# Patient Record
Sex: MALE
[De-identification: names, ages, dates, MRNs, and addresses within clinical notes are randomized per-mention and may not be internally consistent; named-entity substitution may affect disease eponyms.]

---

## 2021-05-27 ENCOUNTER — APPOINTMENT (OUTPATIENT)
Dept: DERMATOLOGY | Facility: CLINIC | Age: 71
End: 2021-05-27
Payer: MEDICARE

## 2021-05-27 PROCEDURE — 99072 ADDL SUPL MATRL&STAF TM PHE: CPT

## 2021-05-27 PROCEDURE — 99204 OFFICE O/P NEW MOD 45 MIN: CPT

## 2021-06-10 ENCOUNTER — TRANSCRIPTION ENCOUNTER (OUTPATIENT)
Age: 71
End: 2021-06-10

## 2021-09-21 ENCOUNTER — APPOINTMENT (OUTPATIENT)
Dept: DERMATOLOGY | Facility: CLINIC | Age: 71
End: 2021-09-21
Payer: MEDICARE

## 2021-09-21 PROCEDURE — 99214 OFFICE O/P EST MOD 30 MIN: CPT

## 2022-06-11 ENCOUNTER — NON-APPOINTMENT (OUTPATIENT)
Age: 72
End: 2022-06-11

## 2024-05-26 ENCOUNTER — NON-APPOINTMENT (OUTPATIENT)
Age: 74
End: 2024-05-26

## 2024-05-27 VITALS — HEART RATE: 66 BPM | RESPIRATION RATE: 14 BRPM | OXYGEN SATURATION: 96 % | TEMPERATURE: 99.4 F

## 2024-05-27 DIAGNOSIS — R01.1 CARDIAC MURMUR, UNSPECIFIED: ICD-10-CM

## 2024-05-27 DIAGNOSIS — J20.9 ACUTE BRONCHITIS, UNSPECIFIED: ICD-10-CM

## 2024-05-27 DIAGNOSIS — J44.9 CHRONIC OBSTRUCTIVE PULMONARY DISEASE, UNSPECIFIED: ICD-10-CM

## 2024-05-27 DIAGNOSIS — R50.9 FEVER, UNSPECIFIED: ICD-10-CM

## 2024-05-27 NOTE — PHYSICAL EXAM
[General Appearance - Alert] : alert [General Appearance - In No Acute Distress] : in no acute distress [Sclera] : the sclera and conjunctiva were normal [Outer Ear] : the ears and nose were normal in appearance [Oropharynx] : the oropharynx was normal with no thrush [Neck Appearance] : the appearance of the neck was normal [Neck Cervical Mass (___cm)] : no neck mass was observed [Jugular Venous Distention Increased] : there was no jugular-venous distention [Thyroid Diffuse Enlargement] : the thyroid was not enlarged [Auscultation Breath Sounds / Voice Sounds] : lungs were clear to auscultation bilaterally [Heart Rate And Rhythm] : heart rate was normal and rhythm regular [Full Pulse] : the pedal pulses are present [Edema] : there was no peripheral edema [Bowel Sounds] : normal bowel sounds [Abdomen Soft] : soft [Abdomen Tenderness] : non-tender [Costovertebral Angle Tenderness] : no CVA tenderness [Abdomen Mass (___ Cm)] : no abdominal mass palpated [No Palpable Adenopathy] : no palpable adenopathy [Skin Color & Pigmentation] : normal skin color and pigmentation [] : no rash [FreeTextEntry1] : No manifestations of endocarditis

## 2024-05-27 NOTE — HISTORY OF PRESENT ILLNESS
[FreeTextEntry1] : patient is a 74-year-old male who I am asked to evaluate due to ongoing fever and bronchitis.  This was a combined telehealth and home visit evaluation Patient returned from vacation with what sounds like viral bronchitis was seen by his primary care physician approximately 7 days ago was placed on cefuroxime 500 mg twice a day his Tmax [id number] Since that time patient has had continued problems of coughing no significant shortness of breath and had a normal chest x-ray and has been able to do most of his normal activities.  Past medical history is significant for cardiac murmur that he still does have for a long time and has not changed according to the patient. Today the wife noted the patient looked gray and I was called to see him urgently at home.  By the time I got to his house he was just complaining of cough.  He had no complaints of chest pain his Tmax again was only 100 he had no chills or shortness of breath.

## 2024-05-27 NOTE — REVIEW OF SYSTEMS
[Fever] : fever [Chills] : no chills [Body Aches] : no body aches [Difficulty Sleeping] : no difficulty sleeping [Feeling Tired] : feeling tired [As noted in HPI] : as noted in HPI [As Noted in HPI] : as noted in HPI [Shortness Of Breath] : no shortness of breath [Wheezing] : wheezing [Cough] : cough [SOB on Exertion] : shortness of breath during exertion [Sputum] : not coughing up ~M sputum [Pleuritic Chest Pain] : no pleuritic chest pain [Negative] : Integumentary [FreeTextEntry5] : h/o m

## 2024-05-27 NOTE — ASSESSMENT
[FreeTextEntry1] :  Asked to evaluate patient because of family concern for his color.  By time he was evaluated his color was normal his pulse ox was normal at rest and with climbing up and down steps twice her heart rate went from 66-72 but pulse oximetry stayed at 96%.  Only finding on exam which of concern is a loud cardiac murmur of unclear duration. While this may all be pulmonary in nature I have concerned about his low-grade fever that has persisted on antibiotics.  Unfortunately blood cultures will probably be negative due to use of cefuroxime as of this morning.  For now I have told the patient to stop all antibiotics blood work and cultures and nasopharyngeal swabs were ordered for the morning.  Chest x-ray was reviewed and was unremarkable.  I have asked him to follow-up with his primary medical doctor who is her cardiologist within 24 hours.  Patient will self monitor temperature 4 times a day and continue to check his pulse oximetry both supine and with ambulation. He will be seen if needed [Treatment Education] : treatment education [Treatment Adherence] : treatment adherence

## 2024-05-28 ENCOUNTER — NON-APPOINTMENT (OUTPATIENT)
Age: 74
End: 2024-05-28

## 2024-05-29 ENCOUNTER — NON-APPOINTMENT (OUTPATIENT)
Age: 74
End: 2024-05-29

## 2024-05-29 LAB
ALBUMIN SERPL ELPH-MCNC: 3.4 G/DL
ALP BLD-CCNC: 100 U/L
ALT SERPL-CCNC: 66 U/L
ANION GAP SERPL CALC-SCNC: 14 MMOL/L
AST SERPL-CCNC: 38 U/L
BASOPHILS # BLD AUTO: 0.03 K/UL
BASOPHILS NFR BLD AUTO: 0.4 %
BILIRUB SERPL-MCNC: 0.7 MG/DL
BUN SERPL-MCNC: 38 MG/DL
CALCIUM SERPL-MCNC: 9.2 MG/DL
CHLORIDE SERPL-SCNC: 103 MMOL/L
CO2 SERPL-SCNC: 20 MMOL/L
CREAT SERPL-MCNC: 1.4 MG/DL
EGFR: 52 ML/MIN/1.73M2
EOSINOPHIL # BLD AUTO: 0.35 K/UL
EOSINOPHIL NFR BLD AUTO: 4.4 %
GLUCOSE SERPL-MCNC: 147 MG/DL
HCT VFR BLD CALC: 33.8 %
HGB BLD-MCNC: 11 G/DL
IMM GRANULOCYTES NFR BLD AUTO: 0.6 %
LYMPHOCYTES # BLD AUTO: 1.43 K/UL
LYMPHOCYTES NFR BLD AUTO: 17.9 %
MAN DIFF?: NORMAL
MCHC RBC-ENTMCNC: 32.2 PG
MCHC RBC-ENTMCNC: 32.5 GM/DL
MCV RBC AUTO: 98.8 FL
MONOCYTES # BLD AUTO: 0.67 K/UL
MONOCYTES NFR BLD AUTO: 8.4 %
NEUTROPHILS # BLD AUTO: 5.45 K/UL
NEUTROPHILS NFR BLD AUTO: 68.3 %
PLATELET # BLD AUTO: 247 K/UL
POTASSIUM SERPL-SCNC: 5.2 MMOL/L
PROT SERPL-MCNC: 6.3 G/DL
RBC # BLD: 3.42 M/UL
RBC # FLD: 13.4 %
SODIUM SERPL-SCNC: 137 MMOL/L
WBC # FLD AUTO: 7.98 K/UL

## 2024-06-03 LAB
BACTERIA BLD CULT: NORMAL
BACTERIA BLD CULT: NORMAL

## 2025-02-26 ENCOUNTER — OFFICE (OUTPATIENT)
Dept: URBAN - METROPOLITAN AREA CLINIC 12 | Facility: CLINIC | Age: 75
Setting detail: OPHTHALMOLOGY
End: 2025-02-26
Payer: MEDICARE

## 2025-02-26 DIAGNOSIS — H35.371: ICD-10-CM

## 2025-02-26 DIAGNOSIS — H25.13: ICD-10-CM

## 2025-02-26 DIAGNOSIS — H33.311: ICD-10-CM

## 2025-02-26 PROCEDURE — 99214 OFFICE O/P EST MOD 30 MIN: CPT | Performed by: OPHTHALMOLOGY

## 2025-02-26 PROCEDURE — 92250 FUNDUS PHOTOGRAPHY W/I&R: CPT | Performed by: OPHTHALMOLOGY

## 2025-02-26 ASSESSMENT — REFRACTION_MANIFEST
OD_AXIS: 105
OS_CYLINDER: -2.75
OS_SPHERE: +1.00
OD_SPHERE: +0.75
OS_AXIS: 90
OD_VA1: 20/50-1
OS_VA1: 20/30-2
OD_CYLINDER: -2.25

## 2025-02-26 ASSESSMENT — VISUAL ACUITY
OS_BCVA: 20/60+2
OD_BCVA: 20/40+1

## 2025-02-26 ASSESSMENT — CONFRONTATIONAL VISUAL FIELD TEST (CVF)
OS_FINDINGS: FULL
OD_FINDINGS: FULL

## 2025-02-26 ASSESSMENT — REFRACTION_AUTOREFRACTION
OD_CYLINDER: -2.25
OD_SPHERE: +0.75
OS_SPHERE: +1.00
OD_AXIS: 105
OS_CYLINDER: -2.75
OS_AXIS: 90

## 2025-02-26 ASSESSMENT — KERATOMETRY
OD_AXISANGLE_DEGREES: 19
OD_K2POWER_DIOPTERS: 39.75
OS_AXISANGLE_DEGREES: 150
OS_K2POWER_DIOPTERS: 41.50
OS_K1POWER_DIOPTERS: 40.50
OD_K1POWER_DIOPTERS: 38.75

## 2025-02-26 ASSESSMENT — TONOMETRY
OS_IOP_MMHG: 11
OD_IOP_MMHG: 11

## 2025-03-13 ENCOUNTER — OFFICE (OUTPATIENT)
Dept: URBAN - METROPOLITAN AREA CLINIC 12 | Facility: CLINIC | Age: 75
Setting detail: OPHTHALMOLOGY
End: 2025-03-13
Payer: MEDICARE

## 2025-03-13 DIAGNOSIS — H25.13: ICD-10-CM

## 2025-03-13 DIAGNOSIS — H25.11: ICD-10-CM

## 2025-03-13 PROCEDURE — 92136 OPHTHALMIC BIOMETRY: CPT | Mod: TC | Performed by: OPHTHALMOLOGY

## 2025-03-13 PROCEDURE — 92136 OPHTHALMIC BIOMETRY: CPT | Mod: 26,RT | Performed by: OPHTHALMOLOGY

## 2025-03-13 PROCEDURE — 99213 OFFICE O/P EST LOW 20 MIN: CPT | Performed by: OPHTHALMOLOGY

## 2025-03-13 ASSESSMENT — REFRACTION_MANIFEST
OS_CYLINDER: -2.75
OD_VA1: 20/50-1
OS_VA1: 20/30-2
OD_SPHERE: +0.75
OS_AXIS: 90
OD_AXIS: 105
OD_CYLINDER: -2.25
OS_SPHERE: +1.00

## 2025-03-13 ASSESSMENT — REFRACTION_AUTOREFRACTION
OS_SPHERE: -0.50
OS_CYLINDER: -1.25
OD_CYLINDER: -1.50
OS_AXIS: 101
OD_SPHERE: +0.25
OD_AXIS: 105

## 2025-03-13 ASSESSMENT — TONOMETRY: OS_IOP_MMHG: 11

## 2025-03-13 ASSESSMENT — CONFRONTATIONAL VISUAL FIELD TEST (CVF)
OD_FINDINGS: FULL
OS_FINDINGS: FULL

## 2025-03-13 ASSESSMENT — KERATOMETRY
OD_K2POWER_DIOPTERS: 39.50
OD_K1POWER_DIOPTERS: 39.00
OD_AXISANGLE_DEGREES: 043
OS_K1POWER_DIOPTERS: 40.75
OS_AXISANGLE_DEGREES: 103
OS_K2POWER_DIOPTERS: 41.25

## 2025-03-13 ASSESSMENT — VISUAL ACUITY
OS_BCVA: 20/80
OD_BCVA: 20/50-2

## 2025-03-25 ENCOUNTER — ASC (OUTPATIENT)
Dept: URBAN - METROPOLITAN AREA SURGERY 8 | Facility: SURGERY | Age: 75
Setting detail: OPHTHALMOLOGY
End: 2025-03-25
Payer: MEDICARE

## 2025-03-25 DIAGNOSIS — H25.11: ICD-10-CM

## 2025-03-25 DIAGNOSIS — H52.221: ICD-10-CM

## 2025-03-25 PROCEDURE — 66984 XCAPSL CTRC RMVL W/O ECP: CPT | Mod: 54,RT | Performed by: OPHTHALMOLOGY

## 2025-03-25 PROCEDURE — FEMTO PRECISION LASER CATARACT SURGERY: Mod: GY | Performed by: OPHTHALMOLOGY

## 2025-03-25 PROCEDURE — 68841 INSJ RX ELUT IMPLT LAC CANAL: CPT | Mod: RT | Performed by: OPHTHALMOLOGY

## 2025-03-25 PROCEDURE — S9986 NOT MEDICALLY NECESSARY SVC: HCPCS | Mod: GX,GY | Performed by: OPHTHALMOLOGY

## 2025-03-26 ENCOUNTER — OFFICE (OUTPATIENT)
Dept: URBAN - METROPOLITAN AREA CLINIC 12 | Facility: CLINIC | Age: 75
Setting detail: OPHTHALMOLOGY
End: 2025-03-26
Payer: MEDICARE

## 2025-03-26 ENCOUNTER — RX ONLY (RX ONLY)
Age: 75
End: 2025-03-26

## 2025-03-26 DIAGNOSIS — H25.12: ICD-10-CM

## 2025-03-26 PROBLEM — Z96.1 PSEUDOPHAKIA-1 DAY P/O CAT W/ IOL: Status: ACTIVE | Noted: 2025-03-26

## 2025-03-26 PROCEDURE — 92136 OPHTHALMIC BIOMETRY: CPT | Mod: 26,LT | Performed by: OPHTHALMOLOGY

## 2025-03-26 ASSESSMENT — REFRACTION_AUTOREFRACTION
OS_AXIS: 094
OD_AXIS: 115
OD_CYLINDER: -1.00
OS_SPHERE: -0.25
OS_CYLINDER: -2.00
OD_SPHERE: PLANO

## 2025-03-26 ASSESSMENT — REFRACTION_MANIFEST
OD_SPHERE: +0.75
OS_SPHERE: +1.00
OD_AXIS: 105
OD_VA1: 20/50-1
OS_AXIS: 90
OD_CYLINDER: -2.25
OS_CYLINDER: -2.75
OS_VA1: 20/30-2

## 2025-03-26 ASSESSMENT — KERATOMETRY
OS_K1POWER_DIOPTERS: 40.75
OS_K2POWER_DIOPTERS: 41.25
OD_K2POWER_DIOPTERS: 39.00
OD_AXISANGLE_DEGREES: 029
OS_AXISANGLE_DEGREES: 160
OD_K1POWER_DIOPTERS: 38.50

## 2025-03-26 ASSESSMENT — CONFRONTATIONAL VISUAL FIELD TEST (CVF)
OS_FINDINGS: FULL
OD_FINDINGS: FULL

## 2025-03-26 ASSESSMENT — TONOMETRY: OD_IOP_MMHG: 15

## 2025-03-26 ASSESSMENT — VISUAL ACUITY
OS_BCVA: 20/60+2
OD_BCVA: 20/60+2

## 2025-04-08 ENCOUNTER — ASC (OUTPATIENT)
Dept: URBAN - METROPOLITAN AREA SURGERY 8 | Facility: SURGERY | Age: 75
Setting detail: OPHTHALMOLOGY
End: 2025-04-08
Payer: MEDICARE

## 2025-04-08 DIAGNOSIS — H25.12: ICD-10-CM

## 2025-04-08 DIAGNOSIS — H52.222: ICD-10-CM

## 2025-04-08 PROCEDURE — S9986 NOT MEDICALLY NECESSARY SVC: HCPCS | Mod: GX,GY | Performed by: OPHTHALMOLOGY

## 2025-04-08 PROCEDURE — FEMTO PRECISION LASER CATARACT SURGERY: Mod: GY | Performed by: OPHTHALMOLOGY

## 2025-04-08 PROCEDURE — 68841 INSJ RX ELUT IMPLT LAC CANAL: CPT | Mod: 79,LT | Performed by: OPHTHALMOLOGY

## 2025-04-08 PROCEDURE — 66984 XCAPSL CTRC RMVL W/O ECP: CPT | Mod: 54,79,LT | Performed by: OPHTHALMOLOGY

## 2025-04-09 ENCOUNTER — OFFICE (OUTPATIENT)
Dept: URBAN - METROPOLITAN AREA CLINIC 12 | Facility: CLINIC | Age: 75
Setting detail: OPHTHALMOLOGY
End: 2025-04-09
Payer: MEDICARE

## 2025-04-09 DIAGNOSIS — Z96.1: ICD-10-CM

## 2025-04-09 PROCEDURE — 99024 POSTOP FOLLOW-UP VISIT: CPT | Performed by: OPTOMETRIST

## 2025-04-09 ASSESSMENT — CONFRONTATIONAL VISUAL FIELD TEST (CVF)
OS_FINDINGS: FULL
OD_FINDINGS: FULL

## 2025-04-09 ASSESSMENT — REFRACTION_AUTOREFRACTION
OD_CYLINDER: -1.25
OS_CYLINDER: -2.25
OS_SPHERE: -0.25
OD_SPHERE: -0.25
OD_AXIS: 099
OS_AXIS: 171

## 2025-04-09 ASSESSMENT — KERATOMETRY
OD_AXISANGLE_DEGREES: 015
OD_K2POWER_DIOPTERS: 39.50
OS_K1POWER_DIOPTERS: 40.25
OS_K2POWER_DIOPTERS: 43.25
OS_AXISANGLE_DEGREES: 085
OD_K1POWER_DIOPTERS: 38.75

## 2025-04-09 ASSESSMENT — REFRACTION_MANIFEST
OS_SPHERE: +1.00
OD_VA1: 20/50-1
OS_CYLINDER: -2.75
OD_AXIS: 105
OD_CYLINDER: -2.25
OD_SPHERE: +0.75
OS_AXIS: 90
OS_VA1: 20/30-2

## 2025-04-09 ASSESSMENT — TONOMETRY
OD_IOP_MMHG: 13
OS_IOP_MMHG: 15

## 2025-04-09 ASSESSMENT — VISUAL ACUITY
OD_BCVA: 20/40+2
OS_BCVA: 20/60

## 2025-07-29 ENCOUNTER — NON-APPOINTMENT (OUTPATIENT)
Age: 75
End: 2025-07-29

## 2025-07-31 ENCOUNTER — APPOINTMENT (OUTPATIENT)
Dept: NEUROSURGERY | Facility: CLINIC | Age: 75
End: 2025-07-31
Payer: MEDICARE

## 2025-07-31 VITALS
HEIGHT: 71 IN | SYSTOLIC BLOOD PRESSURE: 159 MMHG | OXYGEN SATURATION: 98 % | HEART RATE: 89 BPM | WEIGHT: 186 LBS | BODY MASS INDEX: 26.04 KG/M2 | DIASTOLIC BLOOD PRESSURE: 80 MMHG

## 2025-07-31 DIAGNOSIS — M62.838 OTHER MUSCLE SPASM: ICD-10-CM

## 2025-07-31 PROCEDURE — 99204 OFFICE O/P NEW MOD 45 MIN: CPT

## 2025-07-31 RX ORDER — TIZANIDINE 2 MG/1
2 TABLET ORAL EVERY 8 HOURS
Qty: 60 | Refills: 0 | Status: ACTIVE | COMMUNITY
Start: 2025-07-31 | End: 1900-01-01

## 2025-09-08 ENCOUNTER — APPOINTMENT (OUTPATIENT)
Dept: NEUROSURGERY | Facility: CLINIC | Age: 75
End: 2025-09-08
Payer: MEDICARE

## 2025-09-08 VITALS
HEART RATE: 83 BPM | SYSTOLIC BLOOD PRESSURE: 126 MMHG | TEMPERATURE: 97.9 F | DIASTOLIC BLOOD PRESSURE: 60 MMHG | WEIGHT: 186 LBS | BODY MASS INDEX: 26.04 KG/M2 | HEIGHT: 71 IN | OXYGEN SATURATION: 97 %

## 2025-09-08 DIAGNOSIS — M62.838 OTHER MUSCLE SPASM: ICD-10-CM

## 2025-09-08 PROCEDURE — 99214 OFFICE O/P EST MOD 30 MIN: CPT

## 2025-09-08 RX ORDER — METHYLPREDNISOLONE 4 MG/1
4 TABLET ORAL
Qty: 1 | Refills: 0 | Status: ACTIVE | COMMUNITY
Start: 2025-09-08 | End: 1900-01-01